# Patient Record
Sex: FEMALE
[De-identification: names, ages, dates, MRNs, and addresses within clinical notes are randomized per-mention and may not be internally consistent; named-entity substitution may affect disease eponyms.]

---

## 2017-10-02 NOTE — ULT
TRANSVAGINAL PEVLIC ULTRASOUN INCLUDING COLOR AND SPECTRAL DOPPLER IMAGING:

 

History: 

32-year-old female presents with right lower quadrant pain and pelvis pain with vaginal bleeding and
 concern for torsion. 

 

FINDINGS: 

The uterus measures 9.1 x 5.2 x 6.1 cm  with a thickened endometrium at 1.4 cm. The right ovary hermelindo
ures 2.3 x 3.3 x 3.7 cm and contains a small 1.4 cm follicle cyst. The left ovary measures 1.4 x 3.2
 x 3.6 cm. Exam is limited by body habitus. No abscess or abnormal fluid collection. 

 

VASCULAR DUPLEX INCLUDING COLOR AND SPECTRAL DOPPLER IMAGING:

Vascular flow to both ovaries. No evidence for ovarian torsion. 

 

IMPRESSION: 

Unremarkable pelvic ultrasound. 

 

POS: [facility]

## 2018-09-27 ENCOUNTER — HOSPITAL ENCOUNTER (EMERGENCY)
Dept: HOSPITAL 92 - ERS | Age: 33
Discharge: HOME | End: 2018-09-27
Payer: MEDICAID

## 2018-09-27 DIAGNOSIS — O99.281: ICD-10-CM

## 2018-09-27 DIAGNOSIS — Z3A.01: ICD-10-CM

## 2018-09-27 DIAGNOSIS — O20.9: Primary | ICD-10-CM

## 2018-09-27 DIAGNOSIS — E03.9: ICD-10-CM

## 2018-09-27 DIAGNOSIS — O24.111: ICD-10-CM

## 2018-09-27 DIAGNOSIS — F31.9: ICD-10-CM

## 2018-09-27 DIAGNOSIS — F41.9: ICD-10-CM

## 2018-09-27 DIAGNOSIS — Z79.84: ICD-10-CM

## 2018-09-27 DIAGNOSIS — O99.341: ICD-10-CM

## 2018-09-27 DIAGNOSIS — Z79.899: ICD-10-CM

## 2018-09-27 DIAGNOSIS — E11.65: ICD-10-CM

## 2018-09-27 LAB
ANION GAP SERPL CALC-SCNC: 10 MMOL/L (ref 10–20)
BASOPHILS # BLD AUTO: 0 THOU/UL (ref 0–0.2)
BASOPHILS NFR BLD AUTO: 0.5 % (ref 0–1)
BUN SERPL-MCNC: 10 MG/DL (ref 7–18.7)
CALCIUM SERPL-MCNC: 9.1 MG/DL (ref 7.8–10.44)
CHLORIDE SERPL-SCNC: 103 MMOL/L (ref 98–107)
CO2 SERPL-SCNC: 21 MMOL/L (ref 22–29)
CREAT CL PREDICTED SERPL C-G-VRATE: 0 ML/MIN (ref 70–130)
EOSINOPHIL # BLD AUTO: 0.1 THOU/UL (ref 0–0.7)
EOSINOPHIL NFR BLD AUTO: 0.9 % (ref 0–10)
GLUCOSE SERPL-MCNC: 409 MG/DL (ref 70–105)
GLUCOSE UR STRIP-MCNC: >=1000 MG/DL
HCG SERPL QL: POSITIVE
HCG UR QL: POSITIVE
HGB BLD-MCNC: 12.2 G/DL (ref 12–16)
LYMPHOCYTES # BLD: 2.4 THOU/UL (ref 1.2–3.4)
LYMPHOCYTES NFR BLD AUTO: 25.5 % (ref 21–51)
MCH RBC QN AUTO: 24.2 PG (ref 27–31)
MCV RBC AUTO: 77 FL (ref 78–98)
MONOCYTES # BLD AUTO: 0.6 THOU/UL (ref 0.11–0.59)
MONOCYTES NFR BLD AUTO: 6.7 % (ref 0–10)
NEUTROPHILS # BLD AUTO: 6.3 THOU/UL (ref 1.4–6.5)
NEUTROPHILS NFR BLD AUTO: 66.5 % (ref 42–75)
PLATELET # BLD AUTO: 239 THOU/UL (ref 130–400)
POTASSIUM SERPL-SCNC: 4.2 MMOL/L (ref 3.5–5.1)
PREGS CONTROL BACKGROUND?: (no result)
PREGS CONTROL BAR APPEAR?: YES
PREGU CONTROL BACKGROUND?: (no result)
PREGU CONTROL BAR APPEAR?: YES
RBC # BLD AUTO: 5.03 MILL/UL (ref 4.2–5.4)
SODIUM SERPL-SCNC: 130 MMOL/L (ref 136–145)
SP GR UR STRIP: 1.04 (ref 1–1.04)
WBC # BLD AUTO: 9.5 THOU/UL (ref 4.8–10.8)

## 2018-09-27 PROCEDURE — 84703 CHORIONIC GONADOTROPIN ASSAY: CPT

## 2018-09-27 PROCEDURE — 81003 URINALYSIS AUTO W/O SCOPE: CPT

## 2018-09-27 PROCEDURE — 36416 COLLJ CAPILLARY BLOOD SPEC: CPT

## 2018-09-27 PROCEDURE — 81025 URINE PREGNANCY TEST: CPT

## 2018-09-27 PROCEDURE — 85025 COMPLETE CBC W/AUTO DIFF WBC: CPT

## 2018-09-27 PROCEDURE — 36415 COLL VENOUS BLD VENIPUNCTURE: CPT

## 2018-09-27 PROCEDURE — 84702 CHORIONIC GONADOTROPIN TEST: CPT

## 2018-09-27 PROCEDURE — 80048 BASIC METABOLIC PNL TOTAL CA: CPT

## 2018-09-27 PROCEDURE — 76856 US EXAM PELVIC COMPLETE: CPT

## 2018-09-27 NOTE — ULT
PELVIC ULTRASOUND 

9/27/18

 

HISTORY: 

Reported pregnancy, first trimester bleeding/spotting for one day. 

 

FINDINGS:  

Multiple transabdominal and endovaginal sonographic images of the pelvis are obtained. 

 

The uterus measures 9.7 cm x 5.1 cm x 6.6 cm. There is a fluid collection seen in the endometrial can
al with a ring-like echogenicity seen within fluid collection which may represent a gestational sac a
lthough this is larger in size than expected for the size of the presumed gestational sac. However, f
indings may be related to a very early intrauterine gestational age. No fetal pole is seen at this ti
me. Mean sac diameter is 1.29 cm which would correspond to gestational age of 6 weeks and 1 day. Feta
l pole should be seen at this time. No cardiac activity was demonstrated. 

 

The ovaries demonstrate a normal sonographic appearance bilaterally with the right ovary measuring 2.
7 cm x 2.3 cm x 1.9 cm and the left ovary measuring 3.1 cm x 2.9 cm x 2.8 cm. Doppler evaluation of e
ach ovary with spectral analysis and color flow evaluation demonstrates arterial flow in each ovary. 


 

No free fluid is seen in the cul-de-sac. 

 

IMPRESSION:  

Fluid collection in the endometrial canal which may be secondary to early intrauterine gestation. As 
noted above, there is ring-like echogenicity seen within the fluid collection which has the appearanc
e of a yolk sac, but this is much larger in size with respect to the gestational sac than typically e
xpected. These findings could be related to an early intrauterine gestational age. However, mean sac 
diameter corresponds to a gestational age of 6 weeks and 1 day, and fetal pole should be visualized b
y this time with fetal cardiac activity noted. Short interval followup ultrasound examination is ambrosio
mmended. Serial quantitative beta HCG levels should also be performed. 

 

POS: [facility]